# Patient Record
Sex: FEMALE | Race: WHITE | NOT HISPANIC OR LATINO | Employment: OTHER | URBAN - METROPOLITAN AREA
[De-identification: names, ages, dates, MRNs, and addresses within clinical notes are randomized per-mention and may not be internally consistent; named-entity substitution may affect disease eponyms.]

---

## 2022-03-20 ENCOUNTER — HOSPITAL ENCOUNTER (EMERGENCY)
Facility: HOSPITAL | Age: 37
Discharge: HOME | End: 2022-03-20
Attending: EMERGENCY MEDICINE | Admitting: EMERGENCY MEDICINE

## 2022-03-20 VITALS
RESPIRATION RATE: 18 BRPM | WEIGHT: 136.4 LBS | DIASTOLIC BLOOD PRESSURE: 83 MMHG | TEMPERATURE: 98.8 F | SYSTOLIC BLOOD PRESSURE: 145 MMHG | HEART RATE: 97 BPM | OXYGEN SATURATION: 100 %
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
## 2022-03-20 DIAGNOSIS — S01.21XA COMPLEX LACERATION OF NOSE, INITIAL ENCOUNTER: ICD-10-CM

## 2022-03-20 PROCEDURE — 250N000009 HC RX 250: Performed by: EMERGENCY MEDICINE

## 2022-03-20 PROCEDURE — 12013 RPR F/E/E/N/L/M 2.6-5.0 CM: CPT

## 2022-03-20 PROCEDURE — 250N000011 HC RX IP 250 OP 636: Performed by: EMERGENCY MEDICINE

## 2022-03-20 PROCEDURE — 99283 EMERGENCY DEPT VISIT LOW MDM: CPT

## 2022-03-20 PROCEDURE — 250N000013 HC RX MED GY IP 250 OP 250 PS 637: Performed by: EMERGENCY MEDICINE

## 2022-03-20 RX ORDER — ACETAMINOPHEN 325 MG/1
975 TABLET ORAL ONCE
Status: COMPLETED | OUTPATIENT
Start: 2022-03-20 | End: 2022-03-20

## 2022-03-20 RX ADMIN — ACETAMINOPHEN 975 MG: 325 TABLET ORAL at 21:11

## 2022-03-20 RX ADMIN — EPINEPHRINE BITARTRATE 3 ML: 1 POWDER at 19:58

## 2022-03-21 NOTE — ED TRIAGE NOTES
Pt arrives via triage walk in with endorsements of a fall going down the steps (two steps) and fell onto her face first. Pt has lac to nose bleeding controlled and abrasion to right knee. Mild dizziness. A&O x 4  Pt endorses this was after having a few drinks after attending a bridal shower. Pain 4/10  mild headache and nose pain. Non adherent dressing applied in triage and pt holding pressure

## 2022-03-21 NOTE — DISCHARGE INSTRUCTIONS
You were seen in the Emergency Department today for evaluation of a laceration to your nose after a fall.  A total of 5 sutures were placed and should be removed in 5 days.  Take Tylenol and ibuprofen as needed for your headache.  Follow up with your primary care physician to ensure resolution of symptoms. Return if you have new or worsening symptoms.     It is possible that you have a nose fracture.  Nothing would be done until swelling goes down so within a week you are having any difficulty breathing or it does not appear straight then you should follow-up with the ENT doctor to have it set.

## 2022-03-21 NOTE — ED PROVIDER NOTES
EMERGENCY DEPARTMENT ENCOUNTER      NAME: Dennise Kelly  AGE: 36 year old female  YOB: 1985  MRN: [id number]  EVALUATION DATE & TIME: 3/20/2022  7:42 PM    PCP: Carlo Addison    ED PROVIDER: Selene España M.D.      Chief Complaint   Patient presents with     Facial Injury     FINAL IMPRESSION:  1. Complex laceration of nose, initial encounter    2. Fall, initial encounter      ED COURSE & MEDICAL DECISION MAKING:    Pertinent Labs & Imaging studies reviewed. (See chart for details)  ED Course as of 03/20/22 2238   Sun Mar 20, 2022   1957 Patient is a pleasant 36-year-old female who comes in today for laceration over her nose after she fell.  It sounds like she had just been at a wedding and tripped down a couple of steps and landed on her face.  She has a laceration over the left side of her nose.  She is breathing comfortably.  She has some mild tenderness to the nose but no obvious deformity.  She has some mild abrasion to her forehead.  She had no loss of conscious.  Is not nauseated or vomiting.  She says she did not drink that much today.  She is acting like her normal self.  We will put let on her and then plan to wash her out and put some sutures in.  I discussed this with her and she is in agreement with the plan.   2146 I placed a total of 5 sutures on the patient.  The wound on the left side of her nose was quite gaping and it did come together but she is going to have a scar.  The one on the right side was pretty lined up and I just placed 1 suture there.  She tolerated it well.  We will get her discharged home.  I told her to follow-up with ENT if she is having difficulty breathing or cosmetically appears to have an abnormality of the nose once the swelling goes down.  We talked about return precautions and the fact that we did not scan her head and that if she started acting funny or having multiple episodes of vomiting or a more severe headache that she was concerned about  that she could come back and we could scan her at that time.   She is comfortable with the plan and will have the sutures out in 5 days.     7:55 PM I met with the patient, obtained history, performed an initial exam, and discussed options and plan for diagnostics and treatment here in the ED.   9:25 PM I performed a laceration repair on the patient. We discussed the plan for discharge and the patient is agreeable. Reviewed supportive cares, symptomatic treatment, outpatient follow up, and reasons to return to the Emergency Department.     At the conclusion of the encounter I discussed  the results of all of the tests and the disposition with patient.   All questions were answered.  The patient acknowledged understanding and was involved in the decision making regarding the overall care plan.      I discussed with patient the utility, limitations and findings of the exam/interventions/studies done during this visit as well as the list of differential diagnosis and symptoms to monitor/return to ER for.  Additional verbal discharge instructions were provided.       MEDICATIONS GIVEN IN THE EMERGENCY:  Medications   lidocaine/EPINEPHrine/tetracaine (LET) solution KIT 3 mL (3 mLs Topical Given 3/20/22 1958)   acetaminophen (TYLENOL) tablet 975 mg (975 mg Oral Given 3/20/22 2111)       NEW PRESCRIPTIONS STARTED AT TODAY'S ER VISIT  There are no discharge medications for this patient.         =================================================================    HPI    Triage Note: Pt arrives via triage walk in with endorsements of a fall going down the steps (two steps) and fell onto her face first. Pt has lac to nose bleeding controlled and abrasion to right knee. Mild dizziness. A&O x 4  Pt endorses this was after having a few drinks after attending a bridal shower. Pain 4/10  mild headache and nose pain. Non adherent dressing applied in triage and pt holding pressure      Patient information was obtained from:  patient    Use of : N/A        Dennise Kelly is a 36 year old female with no significant PMH who presents via private vehicle for evaluation of laceration.    Patient was walking down two steps when she fell forward and landed directly onto her face. She did not lose consciousness. She endorses a diffuse frontal headache along with a laceration to the bridge of her nose. Patient notes she was at a bridal shower earlier this morning and drank four mimosas, otherwise she has not consumed any alcohol since. She denies any confusion, behavioral changes, nausea, or vomiting. Patient's friend agrees that she is acting her normal self.       REVIEW OF SYSTEMS   Except as stated in the HPI all other systems reviewed and are negative.    PAST MEDICAL HISTORY:  No past medical history on file.    PAST SURGICAL HISTORY:  No past surgical history on file.    CURRENT MEDICATIONS:    No current facility-administered medications for this encounter.  No current outpatient medications on file.    ALLERGIES:  Allergies   Allergen Reactions     Amoxicillin      hives     Keflex [Cephalexin]      hives     Penicillins      hives       FAMILY HISTORY:  No family history on file.    SOCIAL HISTORY:   Social History     Socioeconomic History     Marital status: Single     Spouse name: Not on file     Number of children: Not on file     Years of education: Not on file     Highest education level: Not on file   Occupational History     Not on file   Tobacco Use     Smoking status: Not on file     Smokeless tobacco: Not on file   Substance and Sexual Activity     Alcohol use: Not on file     Drug use: Not on file     Sexual activity: Not on file   Other Topics Concern     Not on file   Social History Narrative     Not on file     Social Determinants of Health     Financial Resource Strain: Not on file   Food Insecurity: Not on file   Transportation Needs: Not on file   Physical Activity: Not on file   Stress: Not on file    Social Connections: Not on file   Intimate Partner Violence: Not on file   Housing Stability: Not on file       PHYSICAL EXAM    VITAL SIGNS: BP (!) 145/83   Pulse 97   Temp 98.8  F (37.1  C) (Temporal)   Resp 18   Wt 61.9 kg (136 lb 6.4 oz)   SpO2 100%    GENERAL: Awake, Alert, answering questions, No acute distress, Well nourished  HEENT: Normal cephalic, 2 lacerations to the nose, no obvious deformity of the nose but diffuse tenderness to the nose.  Abrasions to the forehead.  Bilateral external ears normal, No scleral icterus, mask in place  NECK: No obvious swelling or abnormality, No stridor  PULMONARY:Normal and symmetric breath sounds, No respiratory distress, Lungs clear to auscultation bilaterally. No wheezing  CARDIOVASCULAR: Regular rate and rhythm, Distal pulses present and normal.  ABDOMINAL: Soft, Nondistended, Nontender, No flank tenderness, No palpable masses  BACK: No bruising or tenderness.  EXTREMITIES: Moves all extremities spontaneously, warm, no edema, No major deformities  NEURO: No facial droop, normal motor function, Normal speech   PSYCH: Normal mood and affect  SKIN: 2 cm laceration to the left side of nose that is irregular with some missing tissue and actively bleeding and 1 cm laceration to the right side of the nose. No rashes on visualized skin, dry, warm      PROCEDURES:   PROCEDURE: Laceration Repair   INDICATIONS: Laceration   PROCEDURE PROVIDER: Dr Selene España   SITE:  Nose   TYPE/SIZE: simple, clean and no foreign body visualized  2 cm to left side and 1 cm to right side (total length)   FUNCTIONAL ASSESSMENT: Distal sensation, circulation and motor intact   MEDICATION: LET   PREPARATION: irrigation with Normal saline   DEBRIDEMENT: no debridement   CLOSURE:  Wound was closed in   one layer: Skin closed with 5 stitches of 6-0 Ethilon    Total number of sutures/staples placed: 5       Virginia LARSEN, kaleb serving as a scribe to document services personally performed  by Dr. España based on my observation and the provider's statements to me. I, Selene España MD attest that Virginia Moreno is acting in a scribe capacity, has observed my performance of the services and has documented them in accordance with my direction.    Selene España M.D.  Emergency Medicine  [facility] EMERGENCY DEPARTMENT  [address] [place] [id number] [phone]  Dept: [phone]      Selene España MD  03/20/22 9298

## 2022-03-21 NOTE — ED NOTES
Nasal wounds cleansed w/ hibicleans and water. Pt tolerated well. Two small lacerations, R laceration 1cm long, laceration on L 1.5cm and bone is visibile.

## 2023-02-04 ENCOUNTER — HEALTH MAINTENANCE LETTER (OUTPATIENT)
Age: 38
End: 2023-02-04

## 2024-03-02 ENCOUNTER — HEALTH MAINTENANCE LETTER (OUTPATIENT)
Age: 39
End: 2024-03-02

## 2025-03-15 ENCOUNTER — HEALTH MAINTENANCE LETTER (OUTPATIENT)
Age: 40
End: 2025-03-15